# Patient Record
Sex: MALE | Race: WHITE | NOT HISPANIC OR LATINO | URBAN - METROPOLITAN AREA
[De-identification: names, ages, dates, MRNs, and addresses within clinical notes are randomized per-mention and may not be internally consistent; named-entity substitution may affect disease eponyms.]

---

## 2021-03-01 PROBLEM — 428283002 HISTORY OF POLYP OF COLON: Status: ACTIVE | Noted: 2021-03-01

## 2021-03-03 ENCOUNTER — OFFICE VISIT (OUTPATIENT)
Dept: URBAN - METROPOLITAN AREA SURGERY CENTER 31 | Facility: SURGERY CENTER | Age: 61
End: 2021-03-03
Payer: COMMERCIAL

## 2021-03-03 DIAGNOSIS — Z86.010 H/O ADENOMATOUS POLYP OF COLON: ICD-10-CM

## 2021-03-03 PROCEDURE — G0105 COLORECTAL SCRN; HI RISK IND: HCPCS | Performed by: INTERNAL MEDICINE

## 2021-03-03 PROCEDURE — G8907 PT DOC NO EVENTS ON DISCHARG: HCPCS | Performed by: INTERNAL MEDICINE

## 2021-03-31 ENCOUNTER — TELEPHONE ENCOUNTER (OUTPATIENT)
Dept: URBAN - METROPOLITAN AREA CLINIC 5 | Facility: CLINIC | Age: 61
End: 2021-03-31

## 2024-07-16 ENCOUNTER — OFFICE VISIT (OUTPATIENT)
Dept: URBAN - METROPOLITAN AREA CLINIC 128 | Facility: CLINIC | Age: 64
End: 2024-07-16
Payer: COMMERCIAL

## 2024-07-16 ENCOUNTER — LAB OUTSIDE AN ENCOUNTER (OUTPATIENT)
Dept: URBAN - METROPOLITAN AREA CLINIC 128 | Facility: CLINIC | Age: 64
End: 2024-07-16

## 2024-07-16 ENCOUNTER — DASHBOARD ENCOUNTERS (OUTPATIENT)
Age: 64
End: 2024-07-16

## 2024-07-16 VITALS
SYSTOLIC BLOOD PRESSURE: 122 MMHG | HEART RATE: 76 BPM | TEMPERATURE: 97.5 F | WEIGHT: 232.4 LBS | BODY MASS INDEX: 34.42 KG/M2 | HEIGHT: 69 IN | DIASTOLIC BLOOD PRESSURE: 70 MMHG

## 2024-07-16 DIAGNOSIS — M62.08 DIASTASIS RECTI: ICD-10-CM

## 2024-07-16 DIAGNOSIS — R79.89 ELEVATED LIVER FUNCTION TESTS: ICD-10-CM

## 2024-07-16 DIAGNOSIS — Z86.010 PERSONAL HISTORY OF COLONIC POLYPS: ICD-10-CM

## 2024-07-16 DIAGNOSIS — R10.84 ABDOMINAL PAIN, GENERALIZED: ICD-10-CM

## 2024-07-16 PROBLEM — 428283002: Status: ACTIVE | Noted: 2024-07-16

## 2024-07-16 PROCEDURE — 99204 OFFICE O/P NEW MOD 45 MIN: CPT | Performed by: PHYSICIAN ASSISTANT

## 2024-07-16 RX ORDER — MONTELUKAST 10 MG/1
1 TABLET TABLET, FILM COATED ORAL ONCE A DAY
Status: ACTIVE | COMMUNITY

## 2024-07-16 RX ORDER — ROSUVASTATIN CALCIUM 40 MG/1
1 TABLET TABLET, COATED ORAL ONCE A DAY
Status: ACTIVE | COMMUNITY

## 2024-07-16 RX ORDER — PREGABALIN 75 MG/1
1 CAPSULE IN THE EVENING 1 TO 3 HOURS BEFORE BEDTIME CAPSULE ORAL ONCE A DAY
Status: ACTIVE | COMMUNITY

## 2024-07-16 RX ORDER — GLUCOSAMINE/D3/BOSWELLIA SERRA 1500MG-400
AS DIRECTED TABLET ORAL
Status: ACTIVE | COMMUNITY

## 2024-07-16 RX ORDER — PANTOPRAZOLE SODIUM 20 MG/1
1 TABLET TABLET, DELAYED RELEASE ORAL ONCE A DAY
Qty: 30 | Refills: 1 | OUTPATIENT
Start: 2024-07-16

## 2024-07-16 RX ORDER — HYOSCYAMINE SULFATE 0.12 MG/1
1 TABLET UNDER THE TONGUE AND ALLOW TO DISSOLVE  AS NEEDED TABLET, ORALLY DISINTEGRATING ORAL THREE TIMES A DAY
Qty: 30 | Refills: 0 | OUTPATIENT
Start: 2024-07-16 | End: 2024-08-15

## 2024-07-16 RX ORDER — EZETIMIBE 10 MG/1
1 TABLET TABLET ORAL ONCE A DAY
Status: ACTIVE | COMMUNITY

## 2024-07-16 NOTE — PHYSICAL EXAM GASTROINTESTINAL
Abdomen , soft, tenderness to palpation in the epigastric region, nondistended , no guarding or rigidity , no masses palpable , normal bowel sounds, negative Eugene's sign, negative Rovsing's sign, negative psoas and obturators signs, negative CVA tenderness bilaterally, diastesis recti was noted Liver and Spleen , no hepatosplenomegaly Rectal deferred

## 2024-07-16 NOTE — HPI-TODAY'S VISIT:
The patient is a new patient who elicits having abdominal pain. Location: epigastric Duration of symptoms: x 2 weeks  Associated symptoms: none Severity/ Pain scale: sharp, moderate What alleviates the symptoms:  tums What aggravates the symptoms: nothing Any recent weight changes: weight gain Any recent medication changes: none Any recent dietary changes: none Current stress: none Previous work-up- labs,imaging, scopes: 5/2024 total bili 0.8, alk phos 57, AST 39, ALT 86, WBC 6.8, hgb/hct 15.9/47.1, normal CRP, negative hepatitis panel.  Last colonoscopy: 3/2021, no specimens were collected, done by Dr. Vital, advised to repeat it in 5 years He has had a personal history of colon polyps Patient denies a family history of colon polyps or colon cancer or stomach cancer  Patient denies any heart, lung, or kidney problems.  Patient denies any blood thinners or oxygen therapy. Denies any dialysis. Denies any pacemaker or defibrillator. He ad been on meloxicam for 4 weeks for his hand joint pain but stopped it now He drinks alcohol only socially 2 times a month

## 2024-07-17 ENCOUNTER — LAB OUTSIDE AN ENCOUNTER (OUTPATIENT)
Dept: URBAN - METROPOLITAN AREA CLINIC 128 | Facility: CLINIC | Age: 64
End: 2024-07-17

## 2024-07-17 LAB
CREATININE POC: 1.1
PERFORMING LAB: (no result)

## 2024-07-20 LAB
IMMUNOGLOBULIN A: 151
INTERPRETATION: (no result)
TISSUE TRANSGLUTAMINASE AB, IGA: <1

## 2024-07-23 ENCOUNTER — CLAIMS CREATED FROM THE CLAIM WINDOW (OUTPATIENT)
Dept: URBAN - METROPOLITAN AREA SURGERY CENTER 31 | Facility: SURGERY CENTER | Age: 64
End: 2024-07-23
Payer: COMMERCIAL

## 2024-07-23 ENCOUNTER — CLAIMS CREATED FROM THE CLAIM WINDOW (OUTPATIENT)
Dept: URBAN - METROPOLITAN AREA CLINIC 4 | Facility: CLINIC | Age: 64
End: 2024-07-23
Payer: COMMERCIAL

## 2024-07-23 ENCOUNTER — TELEPHONE ENCOUNTER (OUTPATIENT)
Dept: URBAN - METROPOLITAN AREA CLINIC 128 | Facility: CLINIC | Age: 64
End: 2024-07-23

## 2024-07-23 ENCOUNTER — LAB OUTSIDE AN ENCOUNTER (OUTPATIENT)
Dept: URBAN - METROPOLITAN AREA CLINIC 128 | Facility: CLINIC | Age: 64
End: 2024-07-23

## 2024-07-23 DIAGNOSIS — K31.89 OTHER DISEASES OF STOMACH AND DUODENUM: ICD-10-CM

## 2024-07-23 DIAGNOSIS — R10.13 ABDOMINAL DISCOMFORT, EPIGASTRIC: ICD-10-CM

## 2024-07-23 LAB
ACTIN (SMOOTH MUSCLE) ANTIBODY (IGG): <20
ALBUMIN/GLOBULIN RATIO: 1.8
ALBUMIN: 4.4
ALKALINE PHOSPHATASE: 54
ALPHA-1-ANTITRYPSIN QN: 159
ALT (SGPT): 56
ANA SCREEN, IFA: NEGATIVE
AST (SGOT): 31
BILIRUBIN, DIRECT: 0.2
BILIRUBIN, INDIRECT: 0.6
BILIRUBIN, TOTAL: 0.8
CERULOPLASMIN: 22
FERRITIN, SERUM: 54
GLOBULIN: 2.4
INR: 1
IRON BIND.CAP.(TIBC): 370
IRON SATURATION: 34
IRON: 126
LIPASE: 18
MITOCHONDRIAL (M2) ANTIBODY: <=20
PROTEIN, TOTAL: 6.8
PT: 10.9
RHEUMATOID FACTOR: <10
SJOGREN'S ANTIBODY (SS-A): (no result)
SJOGREN'S ANTIBODY (SS-B): (no result)

## 2024-07-23 PROCEDURE — 43239 EGD BIOPSY SINGLE/MULTIPLE: CPT | Performed by: INTERNAL MEDICINE

## 2024-07-23 PROCEDURE — 00731 ANES UPR GI NDSC PX NOS: CPT | Performed by: NURSE ANESTHETIST, CERTIFIED REGISTERED

## 2024-07-23 PROCEDURE — 88305 TISSUE EXAM BY PATHOLOGIST: CPT | Performed by: PATHOLOGY

## 2024-07-23 RX ORDER — PREGABALIN 75 MG/1
1 CAPSULE IN THE EVENING 1 TO 3 HOURS BEFORE BEDTIME CAPSULE ORAL ONCE A DAY
Status: ACTIVE | COMMUNITY

## 2024-07-23 RX ORDER — HYOSCYAMINE SULFATE 0.12 MG/1
1 TABLET UNDER THE TONGUE AND ALLOW TO DISSOLVE  AS NEEDED TABLET, ORALLY DISINTEGRATING ORAL THREE TIMES A DAY
Qty: 30 | Refills: 0 | Status: ACTIVE | COMMUNITY
Start: 2024-07-16 | End: 2024-08-15

## 2024-07-23 RX ORDER — MONTELUKAST 10 MG/1
1 TABLET TABLET, FILM COATED ORAL ONCE A DAY
Status: ACTIVE | COMMUNITY

## 2024-07-23 RX ORDER — ROSUVASTATIN CALCIUM 40 MG/1
1 TABLET TABLET, COATED ORAL ONCE A DAY
Status: ACTIVE | COMMUNITY

## 2024-07-23 RX ORDER — PANTOPRAZOLE SODIUM 20 MG/1
1 TABLET TABLET, DELAYED RELEASE ORAL ONCE A DAY
Qty: 30 | Refills: 1 | Status: ACTIVE | COMMUNITY
Start: 2024-07-16

## 2024-07-23 RX ORDER — GLUCOSAMINE/D3/BOSWELLIA SERRA 1500MG-400
AS DIRECTED TABLET ORAL
Status: ACTIVE | COMMUNITY

## 2024-07-23 RX ORDER — EZETIMIBE 10 MG/1
1 TABLET TABLET ORAL ONCE A DAY
Status: ACTIVE | COMMUNITY

## 2024-07-25 ENCOUNTER — TELEPHONE ENCOUNTER (OUTPATIENT)
Dept: URBAN - METROPOLITAN AREA CLINIC 19 | Facility: CLINIC | Age: 64
End: 2024-07-25

## 2024-07-29 ENCOUNTER — LAB OUTSIDE AN ENCOUNTER (OUTPATIENT)
Dept: URBAN - METROPOLITAN AREA CLINIC 128 | Facility: CLINIC | Age: 64
End: 2024-07-29

## 2024-08-19 ENCOUNTER — OFFICE VISIT (OUTPATIENT)
Dept: URBAN - METROPOLITAN AREA CLINIC 128 | Facility: CLINIC | Age: 64
End: 2024-08-19

## 2024-08-19 RX ORDER — ROSUVASTATIN CALCIUM 40 MG/1
1 TABLET TABLET, COATED ORAL ONCE A DAY
COMMUNITY

## 2024-08-19 RX ORDER — PANTOPRAZOLE SODIUM 20 MG/1
1 TABLET TABLET, DELAYED RELEASE ORAL ONCE A DAY
Qty: 30 | Refills: 1 | OUTPATIENT

## 2024-08-19 RX ORDER — EZETIMIBE 10 MG/1
1 TABLET TABLET ORAL ONCE A DAY
COMMUNITY

## 2024-08-19 RX ORDER — MONTELUKAST 10 MG/1
1 TABLET TABLET, FILM COATED ORAL ONCE A DAY
COMMUNITY

## 2024-08-19 RX ORDER — PANTOPRAZOLE SODIUM 20 MG/1
1 TABLET TABLET, DELAYED RELEASE ORAL ONCE A DAY
Qty: 30 | Refills: 1 | COMMUNITY
Start: 2024-07-16

## 2024-08-19 RX ORDER — PREGABALIN 75 MG/1
1 CAPSULE IN THE EVENING 1 TO 3 HOURS BEFORE BEDTIME CAPSULE ORAL ONCE A DAY
COMMUNITY

## 2024-08-19 RX ORDER — GLUCOSAMINE/D3/BOSWELLIA SERRA 1500MG-400
AS DIRECTED TABLET ORAL
COMMUNITY

## 2024-08-20 ENCOUNTER — TELEPHONE ENCOUNTER (OUTPATIENT)
Dept: URBAN - METROPOLITAN AREA CLINIC 128 | Facility: CLINIC | Age: 64
End: 2024-08-20

## 2024-11-13 ENCOUNTER — OFFICE VISIT (OUTPATIENT)
Dept: URBAN - METROPOLITAN AREA CLINIC 128 | Facility: CLINIC | Age: 64
End: 2024-11-13
Payer: COMMERCIAL

## 2024-11-13 VITALS
BODY MASS INDEX: 33.15 KG/M2 | WEIGHT: 223.8 LBS | DIASTOLIC BLOOD PRESSURE: 74 MMHG | HEIGHT: 69 IN | HEART RATE: 67 BPM | TEMPERATURE: 97.9 F | SYSTOLIC BLOOD PRESSURE: 118 MMHG

## 2024-11-13 DIAGNOSIS — Z09 ENCOUNTER FOR FOLLOW-UP: ICD-10-CM

## 2024-11-13 DIAGNOSIS — Z86.0100 PERSONAL HISTORY OF COLONIC POLYPS: ICD-10-CM

## 2024-11-13 DIAGNOSIS — M62.08 DIASTASIS RECTI: ICD-10-CM

## 2024-11-13 DIAGNOSIS — K76.0 FATTY LIVER: ICD-10-CM

## 2024-11-13 DIAGNOSIS — R10.84 ABDOMINAL CRAMPING, GENERALIZED: ICD-10-CM

## 2024-11-13 PROBLEM — 197321007: Status: ACTIVE | Noted: 2024-11-13

## 2024-11-13 PROCEDURE — 99213 OFFICE O/P EST LOW 20 MIN: CPT | Performed by: PHYSICIAN ASSISTANT

## 2024-11-13 RX ORDER — MONTELUKAST 10 MG/1
1 TABLET TABLET, FILM COATED ORAL ONCE A DAY
Status: ACTIVE | COMMUNITY

## 2024-11-13 RX ORDER — PREGABALIN 75 MG/1
1 CAPSULE IN THE EVENING 1 TO 3 HOURS BEFORE BEDTIME CAPSULE ORAL ONCE A DAY
Status: ACTIVE | COMMUNITY

## 2024-11-13 RX ORDER — PANTOPRAZOLE SODIUM 20 MG/1
1 TABLET TABLET, DELAYED RELEASE ORAL ONCE A DAY
Qty: 30 | Refills: 1 | OUTPATIENT

## 2024-11-13 RX ORDER — ROSUVASTATIN CALCIUM 40 MG/1
1 TABLET TABLET, COATED ORAL ONCE A DAY
Status: ACTIVE | COMMUNITY

## 2024-11-13 RX ORDER — PANTOPRAZOLE SODIUM 20 MG/1
1 TABLET TABLET, DELAYED RELEASE ORAL ONCE A DAY
Qty: 30 | Refills: 1 | Status: ACTIVE | COMMUNITY

## 2024-11-13 RX ORDER — GLUCOSAMINE/D3/BOSWELLIA SERRA 1500MG-400
AS DIRECTED TABLET ORAL
Status: ACTIVE | COMMUNITY

## 2024-11-13 RX ORDER — EZETIMIBE 10 MG/1
1 TABLET TABLET ORAL ONCE A DAY
Status: ACTIVE | COMMUNITY

## 2024-11-13 NOTE — HPI-TODAY'S VISIT:
The patient is here for a follow up visit for abdominal pain which has resolved now. What alleviates the symptoms:  tums What aggravates the symptoms: nothing Any recent weight changes: weight gain Any recent medication changes: none Any recent dietary changes: none Current stress: none Previous work-up- labs,imaging, scopes: 5/2024 total bili 0.8, alk phos 57, AST 39, ALT 86, WBC 6.8, hgb/hct 15.9/47.1, normal CRP, negative hepatitis panel.  Last colonoscopy: 3/2021, no specimens were collected, done by Dr. Vital, advised to repeat it in 5 years He has had a personal history of colon polyps Patient denies a family history of colon polyps or colon cancer or stomach cancer  Patient denies any heart, lung, or kidney problems.  Patient denies any blood thinners or oxygen therapy. Denies any dialysis. Denies any pacemaker or defibrillator. He ad been on meloxicam for 4 weeks for his hand joint pain but stopped it now He drinks alcohol only socially 2 times a month His LFTs are normal now, total 0.4, alk phos 54, AST 28, ALT 46 His 2024 Abdominal and pelvic CT scan was normal 2024 RUQ US revealed fatty liver 2024 EGD was normal

## 2025-08-04 ENCOUNTER — TELEPHONE ENCOUNTER (OUTPATIENT)
Dept: URBAN - METROPOLITAN AREA CLINIC 6 | Facility: CLINIC | Age: 65
End: 2025-08-04